# Patient Record
Sex: MALE | Race: WHITE | NOT HISPANIC OR LATINO | Employment: FULL TIME | URBAN - NONMETROPOLITAN AREA
[De-identification: names, ages, dates, MRNs, and addresses within clinical notes are randomized per-mention and may not be internally consistent; named-entity substitution may affect disease eponyms.]

---

## 2023-12-14 ENCOUNTER — TELEPHONE (OUTPATIENT)
Dept: FAMILY MEDICINE CLINIC | Age: 23
End: 2023-12-14

## 2023-12-14 NOTE — TELEPHONE ENCOUNTER
Caller: Reagan Garrison    Relationship to patient: Self    Best call back number: [phone]    Chief complaint: SHORTNESS OF BREATH WHILE AT REST - FEELING LIKE PASSING OUT       Patient directed to call 911 or go to their nearest emergency room.     Patient verbalized understanding: [x] Yes  [] No  If no, why?    Additional notes:     THE PATIENT CALLED IN FOR A NEW PATIENT APPOINTMENT. THE PATIENT SAID HE IS EXPERIENCING SHORTNESS OF BREATH WHILE RESTING. HE SAID HE STOOD UP AND FELT LIKE HE WAS GOING TO PASS OUT. HE SAID HE WAS HAVING STOMACH PAINS  AND SORE THROAT. HE WANTED TO COME IN TO BE TESTED FOR COVID. THE PATIENT WAS ADVISED TO GO TO THE ER. HE SAID HE WILL GO TO THE [facility] IN [place]

## 2024-01-03 ENCOUNTER — TELEPHONE (OUTPATIENT)
Dept: FAMILY MEDICINE CLINIC | Age: 24
End: 2024-01-03

## 2024-01-03 NOTE — TELEPHONE ENCOUNTER
Pt was called due to no-showing an appt on 1/2/2024. A vm was left asking if he would like to r/s, and a letter will be sent.